# Patient Record
Sex: FEMALE | Race: WHITE | NOT HISPANIC OR LATINO | Employment: OTHER | URBAN - METROPOLITAN AREA
[De-identification: names, ages, dates, MRNs, and addresses within clinical notes are randomized per-mention and may not be internally consistent; named-entity substitution may affect disease eponyms.]

---

## 2024-01-29 NOTE — PROGRESS NOTES
New Patient Office Visit      Date: 2024   Patient Name: Jodee Newsome  : 1960   MRN: [id number]     Chief Complaint:    Chief Complaint   Patient presents with    Establish Care       History of Present Illness: Jodee Newsome is a 63 y.o. female who is here today to establish care.      Subjective      HPI:  Pt moved to [place] a few years ago from [place]. Lives here with her .   No acute concerns today. Doesn't take any chronic medications.    States years ago (in her 40s) she was being treated for high BP and high cholesterol. Took interested in nelida chi and became very spiritual and turned her health around. BP and cholesterol levels improved and she was able to stop medications.   Feels great. Likes to share her practice with the public when she can.   She does have issues w low sex drive. She is still sexually active w  but has low desire. Feels like its an internal issue that she could work on herself, not necessarily interested in further investigation at this point.     Review of Systems:   Negative/not pertinent unless otherwise noted above in HPI.     Past Medical History: History reviewed. No pertinent past medical history.    Past Surgical History: History reviewed. No pertinent surgical history.    Family History: Pt declined providing family history, reports nothing of relevance.     Social History:   Social History     Socioeconomic History    Marital status:    Tobacco Use    Smoking status: Never    Smokeless tobacco: Never   Substance and Sexual Activity    Alcohol use: Yes    Drug use: Never    Sexual activity: Defer       Medications:   No current outpatient medications on file.    Allergies:   Allergies   Allergen Reactions    Codeine GI Intolerance    Clarithromycin Nausea And Vomiting and Rash       Immunizations:    There is no immunization history on file for this patient.      Tobacco Use: Low Risk  (2024)    Patient History     Smoking  "Tobacco Use: Never     Smokeless Tobacco Use: Never     Passive Exposure: Not on file       Social History     Substance and Sexual Activity   Alcohol Use Yes        Social History     Substance and Sexual Activity   Drug Use Never        Diet/Physical activity: Healthy. counseled on 02/02/24      Sexual Health: Post menopausal    PHQ-2 Depression Screening  Little interest or pleasure in doing things? 0-->not at all   Feeling down, depressed, or hopeless? 0-->not at all   PHQ-2 Total Score 0     PHQ-9 Total Score: 0       Objective     Physical Exam:  Vital Signs:   Vitals:    01/30/24 1015   BP: 136/80   BP Location: Left arm   Patient Position: Sitting   Cuff Size: Adult   Pulse: 78   SpO2: 98%   Weight: 80.3 kg (177 lb)   Height: 180.3 cm (71\")     Body mass index is 24.69 kg/m².    Physical Exam  Constitutional:       Appearance: She is normal weight. She is not ill-appearing.   HENT:      Head: Normocephalic and atraumatic.      Mouth/Throat:      Mouth: Mucous membranes are moist.      Pharynx: Oropharynx is clear. No oropharyngeal exudate or posterior oropharyngeal erythema.   Eyes:      Extraocular Movements: Extraocular movements intact.      Conjunctiva/sclera: Conjunctivae normal.   Cardiovascular:      Rate and Rhythm: Normal rate and regular rhythm.      Heart sounds: Normal heart sounds.   Pulmonary:      Breath sounds: Normal breath sounds. No wheezing or rhonchi.   Abdominal:      General: Abdomen is flat.      Palpations: Abdomen is soft.      Tenderness: There is no abdominal tenderness.   Musculoskeletal:         General: Normal range of motion.      Cervical back: Normal range of motion and neck supple.   Lymphadenopathy:      Cervical: No cervical adenopathy.   Neurological:      General: No focal deficit present.      Mental Status: She is alert.   Psychiatric:         Mood and Affect: Mood normal.         Thought Content: Thought content normal.         Assessment / Plan      Assessment/Plan: "   Diagnoses and all orders for this visit:    1. Encounter to establish care (Primary)  -     Comprehensive Metabolic Panel; Future  -     Lipid Panel With / Chol / HDL Ratio; Future  -     TSH Rfx On Abnormal To Free T4; Future  -     Hemoglobin A1c; Future  -     CBC No Differential; Future    2. Low libido  -     TSH Rfx On Abnormal To Free T4; Future    3. Asymptomatic menopausal state  -     DEXA Bone Density Axial; Future        Healthcare Maintenance:  Counseling provided based on age appropriate USPSTF guidelines.  Pt declined blood work at this time  HM measures were not addressed, will need to come back for physical and health maintenance   DEXA scan was ordered for osteoporosis screening per patient request    Jodee Newsome voices understanding and acceptance of this advice and will call back with any further questions or concerns. AVS with preventive healthcare tips printed for patient.         Follow Up:   Return for FU in 6 months for Annual.        Susan Mast DO  [facility] [address]

## 2024-01-30 ENCOUNTER — OFFICE VISIT (OUTPATIENT)
Dept: FAMILY MEDICINE CLINIC | Facility: CLINIC | Age: 64
End: 2024-01-30
Payer: COMMERCIAL

## 2024-01-30 VITALS
HEIGHT: 71 IN | WEIGHT: 177 LBS | HEART RATE: 78 BPM | BODY MASS INDEX: 24.78 KG/M2 | DIASTOLIC BLOOD PRESSURE: 80 MMHG | SYSTOLIC BLOOD PRESSURE: 136 MMHG | OXYGEN SATURATION: 98 %

## 2024-01-30 DIAGNOSIS — Z78.0 ASYMPTOMATIC MENOPAUSAL STATE: ICD-10-CM

## 2024-01-30 DIAGNOSIS — Z76.89 ENCOUNTER TO ESTABLISH CARE: Primary | ICD-10-CM

## 2024-01-30 DIAGNOSIS — R68.82 LOW LIBIDO: ICD-10-CM

## 2024-02-01 ENCOUNTER — PATIENT ROUNDING (BHMG ONLY) (OUTPATIENT)
Dept: FAMILY MEDICINE CLINIC | Facility: CLINIC | Age: 64
End: 2024-02-01
Payer: COMMERCIAL

## 2024-02-02 ENCOUNTER — TELEPHONE (OUTPATIENT)
Dept: FAMILY MEDICINE CLINIC | Facility: CLINIC | Age: 64
End: 2024-02-02
Payer: COMMERCIAL

## 2024-02-02 NOTE — TELEPHONE ENCOUNTER
"Pt sent this message via [employer]:  \"I read through the notes from my visit and the antibiotic that I am allergic to is BIAXIN, and the one listed is maybe another name for it? Or maybe just a misunderstanding, so I wanted to let you know. Thank you!\"  "

## 2024-02-14 ENCOUNTER — HOSPITAL ENCOUNTER (OUTPATIENT)
Dept: BONE DENSITY | Facility: HOSPITAL | Age: 64
Discharge: HOME | End: 2024-02-14
Admitting: STUDENT IN AN ORGANIZED HEALTH CARE EDUCATION/TRAINING PROGRAM
Payer: COMMERCIAL

## 2024-02-14 DIAGNOSIS — Z78.0 ASYMPTOMATIC MENOPAUSAL STATE: ICD-10-CM

## 2024-02-14 PROCEDURE — 77080 DXA BONE DENSITY AXIAL: CPT

## 2024-03-01 ENCOUNTER — PATIENT MESSAGE (OUTPATIENT)
Dept: FAMILY MEDICINE CLINIC | Facility: CLINIC | Age: 64
End: 2024-03-01
Payer: COMMERCIAL

## 2024-06-12 ENCOUNTER — OFFICE VISIT (OUTPATIENT)
Dept: FAMILY MEDICINE CLINIC | Facility: CLINIC | Age: 64
End: 2024-06-12
Payer: COMMERCIAL

## 2024-06-12 VITALS
SYSTOLIC BLOOD PRESSURE: 132 MMHG | WEIGHT: 171 LBS | OXYGEN SATURATION: 97 % | HEIGHT: 71 IN | HEART RATE: 79 BPM | BODY MASS INDEX: 23.94 KG/M2 | DIASTOLIC BLOOD PRESSURE: 82 MMHG

## 2024-06-12 DIAGNOSIS — K64.4 INFLAMED EXTERNAL HEMORRHOID: Primary | ICD-10-CM

## 2024-06-12 DIAGNOSIS — Z12.11 SCREENING FOR MALIGNANT NEOPLASM OF COLON: ICD-10-CM

## 2024-06-12 PROCEDURE — 99213 OFFICE O/P EST LOW 20 MIN: CPT | Performed by: STUDENT IN AN ORGANIZED HEALTH CARE EDUCATION/TRAINING PROGRAM

## 2024-06-12 NOTE — PROGRESS NOTES
"Chief Complaint   Patient presents with    Hemorrhoids     Pains stared 2 days ago        HPI:  Jodee Newsome is a 64 y.o. female who presents today for \"hemorrhoid.\"    Pt had to strain/bear down with BM last week and then had sudden pain around rectum. Denies any bleeding, hematochezia or melanotic stool. Once pain started she felt a firm nodule just next to the rectum that was tender to touch. The area has seemed to decrease in size over the last 2 days and pain is slightly improved. She has not tried any OTC treatments. Tried to reduce the nodule into the rectum but was unable to do this. She did have hemorrhoid right after she gave birth to her child 27 years ago but no issues since then. She denies chronic constipation. She is due for screening colonoscopy.       PE:  Vitals:    06/12/24 1100   BP: 132/82   Pulse: 79   SpO2: 97%      Body mass index is 23.85 kg/m².    Gen Appearance: NAD  HEENT: Normocephalic, EOMI  Lungs: Normal WOB  MSK: Moves all extremities well, normal gait, no peripheral edema  : Moderate sized firm nodule located at 4:00 position around anus  Neuro: No focal deficits    Current Outpatient Medications   Medication Sig Dispense Refill    Hydrocort-Pramoxine, Perianal, (PROCTOFOAM-HS) 1-1 % rectal foam Insert 1 Application into the rectum 2 (Two) Times a Day. 10 g 0     No current facility-administered medications for this visit.        A/P:  Diagnoses and all orders for this visit:    1. Inflamed external hemorrhoid (Primary)  Suspect external hemorrhoid based on appearance, pain and absence of bleeding.   Trial topical steroid cream. Advised warm sitz baths 2-3 times daily. Additional care info provided w AVS.  Referral to colorectal placed given size  -     Ambulatory Referral to Colorectal Surgery  -     Hydrocort-Pramoxine, Perianal, (PROCTOFOAM-HS) 1-1 % rectal foam; Insert 1 Application into the rectum 2 (Two) Times a Day.  Dispense: 10 g; Refill: 0    2. Screening for malignant " neoplasm of colon  -     Ambulatory Referral to Colorectal Surgery             Dictated Utilizing Dragon Dictation    Please note that portions of this note were completed with a voice recognition program.    Part of this note may be an electronic transcription/translation of spoken language to printed text using the Dragon Dictation System.

## 2024-06-13 ENCOUNTER — TELEPHONE (OUTPATIENT)
Dept: FAMILY MEDICINE CLINIC | Facility: CLINIC | Age: 64
End: 2024-06-13
Payer: COMMERCIAL

## 2024-06-13 DIAGNOSIS — K64.4 INFLAMED EXTERNAL HEMORRHOID: ICD-10-CM

## 2024-06-13 NOTE — TELEPHONE ENCOUNTER
Caller: Jodee Newsome    Relationship: Self    Best call back number:     [phone]        What is the best time to reach you: ANY     Who are you requesting to speak with (clinical staff, provider,  specific staff member): CLINICAL       What was the call regarding: hemroid medication given was given for internal hemroids and she has external. patient is wanting to know what she is supposed to do and if another medication needs to be started instead.     (INSURUCTIONS SAY TO INSERT THE MEDICATION- ) IT DOES NOT NEED TO BE INSERTED AS THE HEMROIDS PATIENT HAS ARE OUTSIDE.        [id number] - [place], [place] - [address] AT [facility] - [phone]  - [phone] FX   PHARMACY STATED THAT THEY DO NOT HAVE THIS IN STOCK(ORDERED AND WILL BE  AND HERE BY 2PM TODAY. - PLEASE CALL AND VERIFY THAT IT CAN BE FILLED AS PATIENT WILL BE OUT OF TOWN TOMORROW.   MEDS NEEDS TO BE FILED BY TONIGHT! PATIENT IS GOING OUT OF TOWN!

## 2024-06-13 NOTE — TELEPHONE ENCOUNTER
Can you check with pharmacy if they have the Proctocream in stock instead? I can change rx if they do.     With either version she doesn't have to insert it and can apply directly to the skin.

## 2024-06-13 NOTE — TELEPHONE ENCOUNTER
Patient informed, however her pharmacy does not have it in stock. She stated we could try and send it to [facility]. I contacted [facility] on [address] [facility] [address] they had 2 options in stock  1- name brand they have the 1%  2- the generic they have it to where its 2.5 of the hydrocort and 1% pramoxine.    Depending on how you wanted it filled it can be sent in either way to [facility]. I have updated the pharmacy for you.